# Patient Record
Sex: MALE | Race: WHITE | NOT HISPANIC OR LATINO | Employment: FULL TIME | URBAN - METROPOLITAN AREA
[De-identification: names, ages, dates, MRNs, and addresses within clinical notes are randomized per-mention and may not be internally consistent; named-entity substitution may affect disease eponyms.]

---

## 2017-03-20 DIAGNOSIS — I71.40 ABDOMINAL ANEURYSM WITHOUT MENTION OF RUPTURE: Primary | ICD-10-CM

## 2017-10-26 ENCOUNTER — TELEPHONE (OUTPATIENT)
Dept: VASCULAR SURGERY | Facility: CLINIC | Age: 72
End: 2017-10-26

## 2017-10-26 NOTE — TELEPHONE ENCOUNTER
----- Message from Adelaida Álvarez sent at 10/26/2017  1:22 PM CDT -----  Contact: Ligia/pt daughter   Call caller regarding getting pt in to see the doctor in [place].   [phone]

## 2017-10-31 ENCOUNTER — TELEPHONE (OUTPATIENT)
Dept: RADIOLOGY | Facility: HOSPITAL | Age: 72
End: 2017-10-31

## 2017-11-01 ENCOUNTER — HOSPITAL ENCOUNTER (OUTPATIENT)
Dept: RADIOLOGY | Facility: HOSPITAL | Age: 72
Discharge: HOME | End: 2017-11-01
Attending: THORACIC SURGERY (CARDIOTHORACIC VASCULAR SURGERY)
Payer: MEDICAID

## 2017-11-01 DIAGNOSIS — I71.40 ANEURYSM OF ABDOMINAL VESSEL: ICD-10-CM

## 2017-11-01 PROCEDURE — 76775 US EXAM ABDO BACK WALL LIM: CPT | Mod: 26,,, | Performed by: RADIOLOGY

## 2017-11-01 PROCEDURE — 76775 US EXAM ABDO BACK WALL LIM: CPT | Mod: TC,PO

## 2017-11-02 ENCOUNTER — OFFICE VISIT (OUTPATIENT)
Dept: VASCULAR SURGERY | Facility: CLINIC | Age: 72
End: 2017-11-02
Payer: MEDICAID

## 2017-11-02 VITALS
BODY MASS INDEX: 23.93 KG/M2 | SYSTOLIC BLOOD PRESSURE: 135 MMHG | DIASTOLIC BLOOD PRESSURE: 85 MMHG | WEIGHT: 139.38 LBS | HEART RATE: 63 BPM

## 2017-11-02 DIAGNOSIS — I71.40 ABDOMINAL AORTIC ANEURYSM WITHOUT RUPTURE: Primary | ICD-10-CM

## 2017-11-02 PROCEDURE — 99999 PR PBB SHADOW E&M-EST. PATIENT-LVL II: CPT | Mod: PBBFAC,,, | Performed by: THORACIC SURGERY (CARDIOTHORACIC VASCULAR SURGERY)

## 2017-11-02 PROCEDURE — 99212 OFFICE O/P EST SF 10 MIN: CPT | Mod: PBBFAC,PO | Performed by: THORACIC SURGERY (CARDIOTHORACIC VASCULAR SURGERY)

## 2017-11-02 PROCEDURE — 99213 OFFICE O/P EST LOW 20 MIN: CPT | Mod: S$PBB,,, | Performed by: THORACIC SURGERY (CARDIOTHORACIC VASCULAR SURGERY)

## 2017-11-02 NOTE — PROGRESS NOTES
OFFICE NOTE    This is a 72-year-old gentleman who was diagnosed with an abdominal aortic   aneurysm in the process of workup for an enlarged prostate.  He has had   ultrasound and CT scan apparently showing a small abdominal aortic aneurysm.    The patient to quit smoking in 2006.  He has no other major apparent medical   problems.  He does take daily aspirin.  He is in good health and he has had no   major surgeries.    PAST MEDICAL HISTORY:  Denies any history of heart disease or stroke.    MEDICATIONS:  His medicines are noted.  They are part of the recent EPIC record.    PHYSICAL EXAMINATION:  VITAL SIGNS:  Stable.  HEENT:  Pupils equal, round and reactive to light.  Nose and throat are clear.  NECK:  Supple.  CHEST:  Clear to auscultation.  HEART:  Regular rate and rhythm.  ABDOMEN:  Benign.  EXTREMITIES:  Femoral and pedal pulses are equal and palpable.    The recent ultrasound was noted.  The aneurysm was 3.7 in greatest transverse   dimension.  I explained to him and the family that there is a little concerned   that the aneurysm will rupture.  The risk of rupture is extremely remote.  He   should get a repeat abdominal aortic ultrasound in four to six months and based   on this, we can make further recommendations.  Apparently, he has gone back to   [place] in the near future, but comes back to the hospitals on a regular basis.    When he comes back to the hospitals, we can repeat the ultrasound if he so desires.      MARIO  dd: 11/02/2017 10:36:58 (CDT)  td: 11/02/2017 23:17:12 (CDT)  Doc ID   #[id number]  Job ID #[id number]    CC: